# Patient Record
Sex: FEMALE | Race: WHITE | Employment: FULL TIME | URBAN - METROPOLITAN AREA
[De-identification: names, ages, dates, MRNs, and addresses within clinical notes are randomized per-mention and may not be internally consistent; named-entity substitution may affect disease eponyms.]

---

## 2017-11-09 ENCOUNTER — RADIANT APPOINTMENT (OUTPATIENT)
Dept: BONE DENSITY | Facility: CLINIC | Age: 62
End: 2017-11-09
Payer: COMMERCIAL

## 2017-11-09 ENCOUNTER — RADIANT APPOINTMENT (OUTPATIENT)
Dept: MAMMOGRAPHY | Facility: CLINIC | Age: 62
End: 2017-11-09
Payer: COMMERCIAL

## 2017-11-09 ENCOUNTER — RESULT FOLLOW UP (OUTPATIENT)
Dept: OBGYN | Facility: CLINIC | Age: 62
End: 2017-11-09

## 2017-11-09 ENCOUNTER — OFFICE VISIT (OUTPATIENT)
Dept: OBGYN | Facility: CLINIC | Age: 62
End: 2017-11-09
Payer: COMMERCIAL

## 2017-11-09 VITALS
DIASTOLIC BLOOD PRESSURE: 86 MMHG | BODY MASS INDEX: 30.16 KG/M2 | WEIGHT: 181 LBS | SYSTOLIC BLOOD PRESSURE: 134 MMHG | HEIGHT: 65 IN

## 2017-11-09 DIAGNOSIS — N87.0 DYSPLASIA OF CERVIX, LOW GRADE (CIN 1): ICD-10-CM

## 2017-11-09 DIAGNOSIS — Z12.31 VISIT FOR SCREENING MAMMOGRAM: ICD-10-CM

## 2017-11-09 DIAGNOSIS — Z78.0 ASYMPTOMATIC POSTMENOPAUSAL STATE: ICD-10-CM

## 2017-11-09 DIAGNOSIS — Z01.419 ENCOUNTER FOR GYNECOLOGICAL EXAMINATION WITHOUT ABNORMAL FINDING: Primary | ICD-10-CM

## 2017-11-09 PROCEDURE — 77080 DXA BONE DENSITY AXIAL: CPT | Performed by: OBSTETRICS & GYNECOLOGY

## 2017-11-09 PROCEDURE — 77063 BREAST TOMOSYNTHESIS BI: CPT | Mod: TC

## 2017-11-09 PROCEDURE — 87624 HPV HI-RISK TYP POOLED RSLT: CPT | Performed by: OBSTETRICS & GYNECOLOGY

## 2017-11-09 PROCEDURE — G0202 SCR MAMMO BI INCL CAD: HCPCS | Mod: TC

## 2017-11-09 PROCEDURE — 99396 PREV VISIT EST AGE 40-64: CPT | Performed by: OBSTETRICS & GYNECOLOGY

## 2017-11-09 PROCEDURE — G0145 SCR C/V CYTO,THINLAYER,RESCR: HCPCS | Performed by: OBSTETRICS & GYNECOLOGY

## 2017-11-09 ASSESSMENT — ANXIETY QUESTIONNAIRES
3. WORRYING TOO MUCH ABOUT DIFFERENT THINGS: NOT AT ALL
5. BEING SO RESTLESS THAT IT IS HARD TO SIT STILL: NOT AT ALL
IF YOU CHECKED OFF ANY PROBLEMS ON THIS QUESTIONNAIRE, HOW DIFFICULT HAVE THESE PROBLEMS MADE IT FOR YOU TO DO YOUR WORK, TAKE CARE OF THINGS AT HOME, OR GET ALONG WITH OTHER PEOPLE: NOT DIFFICULT AT ALL
GAD7 TOTAL SCORE: 0
1. FEELING NERVOUS, ANXIOUS, OR ON EDGE: NOT AT ALL
6. BECOMING EASILY ANNOYED OR IRRITABLE: NOT AT ALL
7. FEELING AFRAID AS IF SOMETHING AWFUL MIGHT HAPPEN: NOT AT ALL
2. NOT BEING ABLE TO STOP OR CONTROL WORRYING: NOT AT ALL

## 2017-11-09 ASSESSMENT — PATIENT HEALTH QUESTIONNAIRE - PHQ9
SUM OF ALL RESPONSES TO PHQ QUESTIONS 1-9: 2
5. POOR APPETITE OR OVEREATING: NOT AT ALL

## 2017-11-09 NOTE — MR AVS SNAPSHOT
"              After Visit Summary   2017    Martine Kennedy    MRN: [id number]           Patient Information     Date Of Birth          1955        Visit Information        Provider Department      2017 1:15 PM Lio Willard MD [facility]        Today's Diagnoses     Encounter for gynecological examination without abnormal finding    -  1       Follow-ups after your visit        Who to contact     If you have questions or need follow up information about today's clinic visit or your schedule please contact [facility] directly at [phone].  Normal or non-critical lab and imaging results will be communicated to you by Zilyohart, letter or phone within 4 business days after the clinic has received the results. If you do not hear from us within 7 days, please contact the clinic through Zilyohart or phone. If you have a critical or abnormal lab result, we will notify you by phone as soon as possible.  Submit refill requests through OX FACTORY or call your pharmacy and they will forward the refill request to us. Please allow 3 business days for your refill to be completed.          Additional Information About Your Visit        MyChart Information     [employer] lets you send messages to your doctor, view your test results, renew your prescriptions, schedule appointments and more. To sign up, go to [email or web address] . Click on \"Log in\" on the left side of the screen, which will take you to the Welcome page. Then click on \"Sign up Now\" on the right side of the page.     You will be asked to enter the access code listed below, as well as some personal information. Please follow the directions to create your username and password.     Your access code is: [email or web address]  Expires: 2018  1:41 PM     Your access code will  in 90 days. If you need help or a new code, please call your [facility] or [phone].        Care EveryWhere ID     This is " "your Care EveryWhere ID. This could be used by other organizations to access your [place] medical records  [id number]        Your Vitals Were     Height Breastfeeding? BMI (Body Mass Index)             5' 4.5\" (1.638 m) No 30.59 kg/m2          Blood Pressure from Last 3 Encounters:   11/09/17 134/86   09/21/16 110/60    Weight from Last 3 Encounters:   11/09/17 181 lb (82.1 kg)   09/21/16 181 lb 9.6 oz (82.4 kg)              We Performed the Following     HPV High Risk Types DNA Cervical     Pap imaged thin layer screen with HPV - recommended age 30 - 65        Primary Care Provider Office Phone # Fax #    Emmanuel Barba [phone]       [place] [address] [place] [address]        Equal Access to Services     ISELA COY : Hadii carmen gomez Soortiz, waaxda luqadaha, qaybcira kaalmada elroy, donal melgar . So [facility] [phone].    ATENCIÓN: Si habla español, tiene a hodge disposición servicios gratuitos de asistencia lingüística. Sonia al [phone].    We comply with applicable federal civil rights laws and [place] laws. We do not discriminate on the basis of race, color, national origin, age, disability, sex, sexual orientation, or gender identity.            Thank you!     Thank you for choosing [facility]  for your care. Our goal is always to provide you with excellent care. Hearing back from our patients is one way we can continue to improve our services. Please take a few minutes to complete the written survey that you may receive in the mail after your visit with us. Thank you!             Your Updated Medication List - Protect others around you: Learn how to safely use, store and throw away your medicines at www.disposemymeds.org.          This list is accurate as of: 11/9/17  1:41 PM.  Always use your most recent med list.                   Brand Name Dispense Instructions for use Diagnosis    latanoprost 0.005 % " ophthalmic solution    XALATAN     daily

## 2017-11-09 NOTE — LETTER
November 17, 2017    Matrine Kennedy  [address]  [facility] [address]    Dear Martine,  We are happy to inform you that your PAP smear result from 11/09/17 is normal.  We are now able to do a follow up test on PAP smears. The DNA test is for HPV (Human Papilloma Virus). Cervical cancer is closely linked with certain types of HPV. Your result showed no evidence of high risk HPV.  Therefore we recommend you return in 1 year for your next pap smear and HPV test.  You will also need to return to the clinic every year for an annual exam and other preventive tests.  Please contact the clinic at [phone] with any questions.  Sincerely,    Lio Willard MD/[facility]

## 2017-11-09 NOTE — LETTER
October 26, 2018      Martine Kennedy  [address] [facility] [address]    Dear Ms.Holland Kennedy,      This letter is to remind you that you are due for your follow up PAP smear and HPV test on or about 11/09/18.    Please call [phone] to schedule your appointment at your earliest convenience.     If you have completed the tests outside of [place], please have the results forwarded to our office. We will update the chart for your primary Physician to review before your next annual physical.     Sincerely,      Lio Willard MD/[facility]

## 2017-11-09 NOTE — PROGRESS NOTES
Martine is a 61 year old  female who presents for annual exam.     Besides routine health maintenance, she has no other health concerns today .    HPI: The patient is seen at this time for annual exam. She is postmenopausal but on no replacement therapy. She is currently emotionally distressed by the drug addiction of her daughter who is now living with her and going through treatment.  The patient's PCP is WEN COHEN.       GYNECOLOGIC HISTORY:    No LMP recorded. Patient is postmenopausal.  Her current contraception method is: tubal ligation and menopause.  She  reports that she has never smoked. She has never used smokeless tobacco.      Patient is sexually active.  STD testing offered?  Declined  Last PHQ-9 score on record =   PHQ-9 SCORE 2017   Total Score 2     Last GAD7 score on record =   JEFF-7 SCORE 2017   Total Score 0     Alcohol Score = 1    HEALTH MAINTENANCE:  Cholesterol:   Cholesterol   Date Value Ref Range Status   10/01/2014 234 (H) <200 mg/dL Final   Last Mammo: , Result: normal, Next Mammo: today  Pap: (  Lab Results   Component Value Date    PAP NIL 2016   Colonoscopy:  2012, Result: normal, Next Colonoscopy: Scheduled for 2017   Dexa: 2017    Health maintenance updated:  yes    HISTORY:  Obstetric History       T2      L2     SAB2   TAB0   Ectopic0   Multiple0   Live Births2       # Outcome Date GA Lbr Vicente/2nd Weight Sex Delivery Anes PTL Lv   4 TAB            3 TAB            2 Term      CS-LTranv   MARJORIE   1 Term      CS-LTranv   MARJORIE          There is no problem list on file for this patient.    Past Surgical History:   Procedure Laterality Date     AS REDUCTION OF LARGE BREAST       COLONOSCOPY       TUBAL LIGATION        Social History   Substance Use Topics     Smoking status: Never Smoker     Smokeless tobacco: Never Used     Alcohol use No      Problem (# of Occurrences) Relation (Name,Age of Onset)    Colon Cancer (2) Mother,  "Father    Hyperlipidemia (1) Mother    Hypertension (2) Mother, Sister    OSTEOPOROSIS (1) Mother            Current Outpatient Prescriptions   Medication Sig     latanoprost (XALATAN) 0.005 % ophthalmic solution daily     No current facility-administered medications for this visit.      Allergies   Allergen Reactions     Amoxicillin      Ampicillin      Other reaction(s): *Unknown - Childhood Rxn     Aspirin      Codeine      Pain Relief      Other reaction(s): Other - Describe In Comment Field  Cysts on legs-surgically removed     Sulfa Drugs        Past medical, surgical, social and family histories were reviewed and updated in EPIC.    ROS:   12 point review of systems negative other than symptoms noted below.  Gastrointestinal: Bloating  Genitourinary: Hot Flashes and Vaginal Dryness  Musculoskeletal: Joint Pain  Endocrine: Loss of Hair  Psychiatric: Difficulty Sleeping    EXAM:  /86  Ht 5' 4.5\" (1.638 m)  Wt 181 lb (82.1 kg)  Breastfeeding? No  BMI 30.59 kg/m2   BMI: Body mass index is 30.59 kg/(m^2).    PHYSICAL EXAM:  Constitutional:  Appearance: Well nourished, well developed, alert, in no acute distress  Neck:  Lymph Nodes:  No lymphadenopathy present    Thyroid:  Gland size normal, nontender, no nodules or masses present  on palpation  Chest:  Respiratory Effort:  Breathing unlabored  Cardiovascular:    Heart: Auscultation:  Regular rate, normal rhythm, no murmurs present  Breasts: Inspection of Breasts:  No lymphadenopathy present., Palpation of Breasts and Axillae:  No masses present on palpation, no breast tenderness., Axillary Lymph Nodes:  No lymphadenopathy present. and No nodularity, asymmetry or nipple discharge bilaterally.  Gastrointestinal:   Abdominal Examination:  Abdomen nontender to palpation, tone normal without rigidity or guarding, no masses present, umbilicus without lesions   Liver and Spleen:  No hepatomegaly present, liver nontender to palpation    Hernias:  No hernias " present  Lymphatic: Lymph Nodes:  No other lymphadenopathy present  Skin:  General Inspection:  No rashes present, no lesions present, no areas of  discoloration    Genitalia and Groin:  No rashes present, no lesions present, no areas of  discoloration, no masses present  Neurologic/Psychiatric:    Mental Status:  Oriented X3     Pelvic Exam:  External Genitalia:     Normal appearance for age, no discharge present, no tenderness present, no inflammatory lesions present, color normal  Vagina:     Normal vaginal vault without central or paravaginal defects, ATROPHIC  Bladder:     Nontender to palpation  Urethra:   Urethral Body:  Urethra palpation normal, urethra structural support normal   Urethral Meatus:  No erythema or lesions present  Cervix:     Appearance healthy, no lesions present, nontender to palpation, no bleeding present  Uterus:     Nontender to palpation, no masses present, position anteflexed, mobility: normal  Adnexa:     No adnexal tenderness present, no adnexal masses present  Perineum:     Perineum within normal limits, no evidence of trauma, no rashes or skin lesions present  Inguinal Lymph Nodes:     No lymphadenopathy present        COUNSELING:   Reviewed preventive health counseling, as reflected in patient instructions       Regular exercise       Healthy diet/nutrition    BMI: Body mass index is 30.59 kg/(m^2).  Weight management plan: Discussed healthy diet and exercise guidelines and patient will follow up in 12 months in clinic to re-evaluate.    ASSESSMENT:  61 year old female with satisfactory annual exam.    ICD-10-CM    1. Encounter for gynecological examination without abnormal finding Z01.419        PLAN: We will review the Mammogram and bone density results with her when available.      Lio Willard MD

## 2017-11-10 ASSESSMENT — ANXIETY QUESTIONNAIRES: GAD7 TOTAL SCORE: 0

## 2017-11-13 LAB
COPATH REPORT: NORMAL
PAP: NORMAL

## 2017-11-15 LAB
FINAL DIAGNOSIS: NORMAL
HPV HR 12 DNA CVX QL NAA+PROBE: NEGATIVE
HPV16 DNA SPEC QL NAA+PROBE: NEGATIVE
HPV18 DNA SPEC QL NAA+PROBE: NEGATIVE
SPECIMEN DESCRIPTION: NORMAL

## 2017-11-16 NOTE — PROGRESS NOTES
4/9/13 Ectocervix sampling - LSIL/JOSIAS 1  8/26/13 NIL pap  10/1/14 NIL pap  9/21/16 NIL pap  11/9/17 NIL pap, neg HR HPV. Plan 1 year cotest  11/17/17 Result letter printed and sent at request of RN. ([facility])   10/26/18 Cotest reminder letter sent. ([facility])  11/16/18 Spoke with pt--very agitated--states she will call when she has time. ([facility])  11/30/18 Patient is lost to pap tracking follow-up. FYI routed to provider. ([facility])

## 2018-11-16 ENCOUNTER — TELEPHONE (OUTPATIENT)
Dept: OBGYN | Facility: CLINIC | Age: 63
End: 2018-11-16

## 2018-11-29 NOTE — TELEPHONE ENCOUNTER
Pt is past due for f/u pap smear.  Spoke with pt--very agitated--states she will call when she has time.  Rupinder Goins,    Pap Tracking       stable

## 2021-02-16 NOTE — PROGRESS NOTES
Martine is a 65 year old  female who presents for annual exam.     Besides routine health maintenance, she has no other health concerns today .    Do you have a Health Care Directive?: No, advance care planning information given to patient to review.  Patient plans to discuss their wishes with loved ones or provider.      Fall risk:   Fallen 2 or more times in the past year?: No  Any fall with injury in the past year?: No    HPI:  Martine is a 65 year old female here for her annual exam. She states that she has been overall doing well the past year with her health and has no concerns today. She does share that her sister passed away from pancreatic cancer this past year. 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 She has gotten her first vaccine for COVID 19.   The patient's PCP is WEN COHEN.      GYNECOLOGIC HISTORY:  No LMP recorded. Patient is postmenopausal..   reports that she has never smoked. She has never used smokeless tobacco.    Patient is sexually active.  Last PHQ-9 score on record=   PHQ-9 SCORE 2021   PHQ-9 Total Score 0     Last GAD7 score on record=   JEFF-7 SCORE 2016   Total Score 1 0 0     Alcohol Score = 0    HEALTH MAINTENANCE:  Cholesterol:  Recent Labs   Lab Test 10/01/14   CHOL 234*   HDL 50   *   TRIG 125     Last Mammo: 2017, Result: Normal, Next Mammo: Today   Pap:   Lab Results   Component Value Date    PAP NIL, HPV- 2017    PAP NIL 2016      DEXA:  2017  Colonoscopy:  , Result:  Normal, Next Colonoscopy: 6 years.    Health maintenance updated:  yes    HISTORY:  OB History    Para Term  AB Living   4 2 2 0 2 2   SAB TAB Ectopic Multiple Live Births   0 2 0 0 2      # Outcome Date GA Lbr Vicente/2nd Weight Sex Delivery Anes PTL Lv   4 TAB            3 TAB            2 Term      CS-LTranv   MARJORIE   1 Term      CS-LTranv   MARJORIE     Patient Active  "Problem List   Diagnosis     Dysplasia of cervix, low grade (JOSIAS 1)     Past Surgical History:   Procedure Laterality Date     AS REDUCTION OF LARGE BREAST       CATARACT IOL, RT/LT  07/2020     COLONOSCOPY  01/01/2012     EYE SURGERY  09/2019    Retina surgery     TUBAL LIGATION        Social History     Tobacco Use     Smoking status: Never Smoker     Smokeless tobacco: Never Used   Substance Use Topics     Alcohol use: No     Alcohol/week: 0.0 standard drinks      Problem (# of Occurrences) Relation (Name,Age of Onset)    Colon Cancer (2) Mother, Father    Hyperlipidemia (1) Mother    Hypertension (2) Mother, Sister    Liver Disease (1) Sister: Sclerosis    Osteoporosis (1) Mother    Pancreatic Cancer (1) Sister            No current outpatient medications on file.     No current facility-administered medications for this visit.        Allergies   Allergen Reactions     Amoxicillin      Ampicillin      Other reaction(s): *Unknown - Childhood Rxn     Aspirin      Codeine      Pain Relief      Other reaction(s): Other - Describe In Comment Field  Cysts on legs-surgically removed     Sulfa Drugs        Past medical, surgical, social and family history were reviewed and updated in EPIC.    ROS:   12 point review of systems negative other than symptoms noted below or in the HPI.  No urinary frequency or dysuria, bladder or kidney problems    EXAM:  BP (!) 158/76   Pulse 72   Ht 1.651 m (5' 5\")   Wt 79.1 kg (174 lb 6.4 oz)   Breastfeeding No   BMI 29.02 kg/m     BMI: Body mass index is 29.02 kg/m .    EXAM:  Constitutional: Appearance: Well nourished, well developed alert, in no acute distress  Neck:  Lymph Nodes:  No lymphadenopathy present    Thyroid:  Gland size normal, nontender, no nodules or masses present  on palpation  Chest:  Respiratory Effort:  Breathing unlabored  Cardiovascular:Heart    Auscultation:  Regular rate, normal rhythm, no murmurs present  Breasts: Inspection of Breasts:  No lymphadenopathy " present., Palpation of Breasts and Axillae:  No masses present on palpation, no breast tenderness., Axillary Lymph Nodes:  No lymphadenopathy present. and No nodularity, asymmetry or nipple discharge bilaterally.  Gastrointestinal:  Abdominal Examination:  Abdomen nontender to palpation, tone normal without     rigidity or guarding, no masses present, umbilicus without lesions    Liver and speen:  No hepatomegaly present, liver nontender to palpation    Hernias:  No hernias present  Lymphatic: Lymph Nodes:  No other lymphadenopathy present  Skin:  General Inspection:  No rashes present, no lesions present, no areas of  discoloration.    Genitalia and Groin:  No rashes present, no lesions present, no areas of  discoloration, no masses present  Neurologic/Psychiatric:    Mental Status:  Oriented X3     Pelvic Exam:  External Genitalia:     Normal appearance for age, no discharge present, no tenderness present, no inflammatory lesions present, color normal  Vagina:     Normal vaginal vault without central or paravaginal defects, no discharge present, no inflammatory lesions present, no masses present  Bladder:     Nontender to palpation  Urethra:   Urethral Body:  Urethra palpation normal, urethra structural support normal   Urethral Meatus:  No erythema or lesions present  Cervix:     Appearance healthy, no lesions present, nontender to palpation, no bleeding present  Uterus:     Uterus: firm, normal sized and nontender, anteverted in position.   Adnexa:     No adnexal tenderness present, no adnexal masses present  Perineum:     Perineum within normal limits, no evidence of trauma, no rashes or skin lesions present  Anus:     Anus within normal limits, no hemorrhoids present  Inguinal Lymph Nodes:     No lymphadenopathy present  Pubic Hair:     Normal pubic hair distribution for age  Genitalia and Groin:     No rashes present, no lesions present, no areas of discoloration, no masses present      COUNSELING:   Reviewed  preventive health counseling, as reflected in patient instructions    BMI:  Body mass index is 29.02 kg/m .  Weight management plan: Discussed healthy diet and exercise guidelines   reports that she has never smoked. She has never used smokeless tobacco.      ASSESSMENT:  65 year old female with satisfactory annual exam. Bone densitometry performed. Spine: -1.7 Hip: -1.6. Results discussed with patient.     ICD-10-CM    1. Encounter for gynecological examination without abnormal finding  Z01.419 Pap imaged thin layer screen with HPV - recommended age 30 - 65     HPV High Risk Types DNA Cervical       PLAN:  PAP and mammogram done today. Will contact patient pending results of these. DEXA results discussed with patient. Instructed patient to start taking a regimen of calcium and Vit D for bone health. She will return next year for her annual exam and call the clinic if she has any issues before this time.     Lio Willard MD

## 2021-02-17 ENCOUNTER — ANCILLARY PROCEDURE (OUTPATIENT)
Dept: BONE DENSITY | Facility: CLINIC | Age: 66
End: 2021-02-17
Payer: COMMERCIAL

## 2021-02-17 ENCOUNTER — OFFICE VISIT (OUTPATIENT)
Dept: OBGYN | Facility: CLINIC | Age: 66
End: 2021-02-17
Payer: COMMERCIAL

## 2021-02-17 ENCOUNTER — ANCILLARY PROCEDURE (OUTPATIENT)
Dept: MAMMOGRAPHY | Facility: CLINIC | Age: 66
End: 2021-02-17
Payer: COMMERCIAL

## 2021-02-17 VITALS
WEIGHT: 174.4 LBS | SYSTOLIC BLOOD PRESSURE: 158 MMHG | HEIGHT: 65 IN | BODY MASS INDEX: 29.06 KG/M2 | DIASTOLIC BLOOD PRESSURE: 76 MMHG | HEART RATE: 72 BPM

## 2021-02-17 DIAGNOSIS — Z12.31 VISIT FOR SCREENING MAMMOGRAM: ICD-10-CM

## 2021-02-17 DIAGNOSIS — Z01.419 ENCOUNTER FOR GYNECOLOGICAL EXAMINATION WITHOUT ABNORMAL FINDING: Primary | ICD-10-CM

## 2021-02-17 PROCEDURE — G0145 SCR C/V CYTO,THINLAYER,RESCR: HCPCS | Performed by: OBSTETRICS & GYNECOLOGY

## 2021-02-17 PROCEDURE — 77080 DXA BONE DENSITY AXIAL: CPT | Performed by: OBSTETRICS & GYNECOLOGY

## 2021-02-17 PROCEDURE — 87624 HPV HI-RISK TYP POOLED RSLT: CPT | Performed by: OBSTETRICS & GYNECOLOGY

## 2021-02-17 PROCEDURE — 99397 PER PM REEVAL EST PAT 65+ YR: CPT | Performed by: OBSTETRICS & GYNECOLOGY

## 2021-02-17 PROCEDURE — 77067 SCR MAMMO BI INCL CAD: CPT | Mod: TC | Performed by: RADIOLOGY

## 2021-02-17 ASSESSMENT — ANXIETY QUESTIONNAIRES
1. FEELING NERVOUS, ANXIOUS, OR ON EDGE: NOT AT ALL
6. BECOMING EASILY ANNOYED OR IRRITABLE: NOT AT ALL
IF YOU CHECKED OFF ANY PROBLEMS ON THIS QUESTIONNAIRE, HOW DIFFICULT HAVE THESE PROBLEMS MADE IT FOR YOU TO DO YOUR WORK, TAKE CARE OF THINGS AT HOME, OR GET ALONG WITH OTHER PEOPLE: NOT DIFFICULT AT ALL
5. BEING SO RESTLESS THAT IT IS HARD TO SIT STILL: NOT AT ALL
7. FEELING AFRAID AS IF SOMETHING AWFUL MIGHT HAPPEN: NOT AT ALL
GAD7 TOTAL SCORE: 0
3. WORRYING TOO MUCH ABOUT DIFFERENT THINGS: NOT AT ALL
2. NOT BEING ABLE TO STOP OR CONTROL WORRYING: NOT AT ALL

## 2021-02-17 ASSESSMENT — PATIENT HEALTH QUESTIONNAIRE - PHQ9
5. POOR APPETITE OR OVEREATING: NOT AT ALL
SUM OF ALL RESPONSES TO PHQ QUESTIONS 1-9: 0

## 2021-02-17 ASSESSMENT — MIFFLIN-ST. JEOR: SCORE: 1331.95

## 2021-02-18 ASSESSMENT — ANXIETY QUESTIONNAIRES: GAD7 TOTAL SCORE: 0

## 2021-02-19 LAB
COPATH REPORT: NORMAL
PAP: NORMAL

## 2021-02-22 LAB
FINAL DIAGNOSIS: NORMAL
HPV HR 12 DNA CVX QL NAA+PROBE: NEGATIVE
HPV16 DNA SPEC QL NAA+PROBE: NEGATIVE
HPV18 DNA SPEC QL NAA+PROBE: NEGATIVE
SPECIMEN DESCRIPTION: NORMAL
SPECIMEN SOURCE CVX/VAG CYTO: NORMAL